# Patient Record
(demographics unavailable — no encounter records)

---

## 2024-12-10 NOTE — HISTORY OF PRESENT ILLNESS
[FreeTextEntry1] : Patient is a 41-year-old female who presents today for an evaluation regarding her right foot.  She states that she has had pain for the past 3 months.  And describes it as an on and off discomfort with prolonged standing and walking.  She also states that driving aggravates this condition.  She did seek medical attention with a podiatrist.  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  She was then given a cortisone injection we provided her with some mild relief for a period of 1 month.  As this still persist she decided to seek further medical attention.  Denies any specific injury or accident.

## 2024-12-10 NOTE — END OF VISIT
[Time Spent: ___ minutes] : I have spent [unfilled] minutes of time on the encounter which excludes teaching and separately reported services. Detail Level: Detailed X Size Of Lesion In Cm (Optional): 0

## 2024-12-10 NOTE — DISCUSSION/SUMMARY
[de-identified] : After thorough history full examination and review of the x-rays.  It was explained to the patient that she does have some metatarsal head pain at the second MTP.  She was advised to continue with conservative measures including proper shoe wear.  Ice packs/moist heat and anti-inflammatories.  Patient is advised to continue with this conservative measures but if this pain persist to return back to the office for reevaluation.  45 minutes were spent, face to face, in direct consultation with the patient. This includes reviewing the natural history of their Dx., eliciting the history, performing an orthopedic exam, review of the x-ray findings, forming a differential Dx and discussing all treatment options. This Includes both surgical and non-surgical treatments. I also reviewed all the risks and benefits of non-operative & operative Tx options, future impact into orthopedic functions/problems, activity restrictions both at home and at work, and all follow up requirements.

## 2024-12-10 NOTE — PHYSICAL EXAM
[de-identified] : On physical examination of the right foot.  The skin is clean dry and intact with no areas of redness swelling or heat noted.  There is some pain and tenderness particularly at the second MTP metatarsal pads.  On the plantar aspect.  She has adequate range of motion neurovascularly intact with good distal pulses.  Toes are pink and mobile. [de-identified] : X-rays of the right foot were taken in the office today.  This was 3 views and they were standing.  The AP the lateral and oblique views.  The results were negative

## 2024-12-10 NOTE — DISCUSSION/SUMMARY
[de-identified] : After thorough history full examination and review of the x-rays.  It was explained to the patient that she does have some metatarsal head pain at the second MTP.  She was advised to continue with conservative measures including proper shoe wear.  Ice packs/moist heat and anti-inflammatories.  Patient is advised to continue with this conservative measures but if this pain persist to return back to the office for reevaluation.  45 minutes were spent, face to face, in direct consultation with the patient. This includes reviewing the natural history of their Dx., eliciting the history, performing an orthopedic exam, review of the x-ray findings, forming a differential Dx and discussing all treatment options. This Includes both surgical and non-surgical treatments. I also reviewed all the risks and benefits of non-operative & operative Tx options, future impact into orthopedic functions/problems, activity restrictions both at home and at work, and all follow up requirements.

## 2024-12-10 NOTE — PHYSICAL EXAM
[de-identified] : On physical examination of the right foot.  The skin is clean dry and intact with no areas of redness swelling or heat noted.  There is some pain and tenderness particularly at the second MTP metatarsal pads.  On the plantar aspect.  She has adequate range of motion neurovascularly intact with good distal pulses.  Toes are pink and mobile. [de-identified] : X-rays of the right foot were taken in the office today.  This was 3 views and they were standing.  The AP the lateral and oblique views.  The results were negative